# Patient Record
(demographics unavailable — no encounter records)

---

## 2024-12-27 NOTE — HISTORY OF PRESENT ILLNESS
[de-identified] :  HPI: 12/27/2024: NASIR PEREZ is a 43 year y/o F who presents for left buttock pain. Patient has been experiencing these symptoms for two weeks. Patient notes symptoms worsen with prolonged standing, sleeping on the affected side. Patient denies any low back pain. Patient also notes an episode of left lower extremity swelling but notes that this swelling does improve throughout the day.  for the [employer]. Affects her when she steps up with the left lower extremity to go up a ladder and affecting her ability to do her job. Patient also notes numbness in the LLE into her left knee. Patient has attempted conservative measures including ibuprofen with only temporary relief. Patient tolerates well with no GI distress. Patient denies any kidney issues or gastric ulcers.    Patient has had a previous examination with a chiropractor for her hips 20+ years ago.   Patient is not on a blood thinner.     Patient presents today, 43 year F, for evaluation of their LT HIP Date of Injury/Onset: 2 weeks, worse since 12/23/2024. Patient is doing work around the house on weekends and feels that may be aggravating. Mechanism of injury: NKI This is not a Work-Related Injury being treated under Worker's Compensation. This is not an athletic injury occurring associated with an interscholastic or organized sports team.   Pain: At Rest: 4-5 /10 With Activity: 6-7 /10 Quality of symptoms: intermittent burning pain on side and back of hip. numbness and tingling radiating down to calf. Patient reports swelling in hip down to knee since 12/23/2024. Patient denies lower back pain.    Affecting Sleep: Yes Stiffness upon waking, lasting greater than 30mins: Yes Difficulty with stairs: Yes Difficulty getting in and out of car: Yes Difficulty applying shoe: No Sit to stand stiffness: Yes Affects walking short/long distances? Yes Home/Work/Recreation affected? Yes   Improves with:   Worse with:  sitting, standing, walking, sleeping Have you been treated for this in the past? no but says she saw a chiropractor for her hips 20+ years ago Have you had surgery for this in the past? none   OTC Medicines: ibuprofen RX medicines:  none Heat, Ice, Elevation: elevation helps with swelling   CSI or Gel Injections: None Physical Therapy/ HEP: None. Pt reports doing her own stretches at home.   Previous Treatment/Imaging/Studies Since Last Visit: none

## 2024-12-27 NOTE — IMAGING
[de-identified] : LEFT Lower Extremity examined.   Inspection no gross deformity, skin intact, no erythema Palpation; patient with - tenderness to palpation in the groin, + tenderness over the greater troches ++ TTP piriformis/sciatic region ROM: 120 flexion, 30 IR, 60 ER, 50 Abduction, 20 Adduction No groin pain with ROM - [place] - FADIR, - JORDIN - SLR 5/5 motor to lower extremity Sensation intact to light touch throughout all lower extremity dermatomes No numbness in lateral femoral cutaneous nerve 2+ distal pulses No Leg length discrepancy assessed at the medial mal   AP Pelvis, AP Hip and Frog lateral of LEFT hip. Joint spaces well maintained. Mild dysplastic changes noted. No fractures noted.  AP/Lateral of lumbar spine. No fractures noted. No listhesis noted. Disc spaces appear to be well maintained.

## 2024-12-27 NOTE — ASSESSMENT
[FreeTextEntry1] : NASIR PEREZ is a 43-year y/o F with history and physical exam consistent with left hip abductor tendonitis. No instability or weakness that would warrant an MRI today.   - Recommend physical therapy to regain range of motion, strengthening and symptomatic improvement. Prescription given in office today. Patient can continue with her exercises at home independently.  -  After discussion of options, Patient was provided with a prescription for Meloxicam 15mg. Time was taken to discuss the importance of proper prescription drug management. They were instructed to take this daily with food for two weeks and then intermittently as needed. The patient was also warned of potential risks/side effects of the medication. These potential risks include bruising, gastrointestinal bleed, gastrointestinal burning, allergic reaction and reduced blood clotting. The patient expressed verbal understanding. I recommend that the patient follow-up with their medical physician to discuss any significant specific potential issues with long term use such as interactions with current medications or with exacerbation of underlying medical morbidities.  - Recommend rest, ice, compression, elevation - Recommend activity modification, avoid strenuous or high impact activities   Patient was educated on their diagnosis today. Emphasized the importance of gentle stretching and strengthening. Patient expressed understanding and all questions were answered today.   Follow up in 6 weeks with physical therapy and meloxicam. At that point, we can consider an MRI of the left hip to evaluate for hip abductor tear.

## 2025-02-12 NOTE — HISTORY OF PRESENT ILLNESS
[de-identified] : 2/12/2025: NASIR PEREZ is a 43 year y/o F who presents for a f/u evaluation of left buttock pain. Patient was prescribed PT and Meloxicam at her last visit. Patient has had 99% improvement with four weeks twice weekly PT and Meloxicam. Currently not taking Meloxicam and reports being almost pain free.   HPI: 12/27/2024: NASIR PEREZ is a 43 year y/o F who presents for left buttock pain. Patient has been experiencing these symptoms for two weeks. Patient notes symptoms worsen with prolonged standing, sleeping on the affected side. Patient denies any low back pain. Patient also notes an episode of left lower extremity swelling but notes that this swelling does improve throughout the day.  for the [employer] Affects her when she steps up with the left lower extremity to go up a ladder and affecting her ability to do her job. Patient also notes numbness in the LLE into her left knee. Patient has attempted conservative measures including ibuprofen with only temporary relief. Patient tolerates well with no GI distress. Patient denies any kidney issues or gastric ulcers.    Patient has had a previous examination with a chiropractor for her hips 20+ years ago.   Patient is not on a blood thinner.     Patient presents today, 43 year F, for evaluation of their LT HIP Date of Injury/Onset: 2 weeks, worse since 12/23/2024. Patient is doing work around the house on weekends and feels that may be aggravating. Mechanism of injury: NKI This is not a Work-Related Injury being treated under Worker's Compensation. This is not an athletic injury occurring associated with an interscholastic or organized sports team.   Pain: At Rest: 4-5 /10 With Activity: 6-7 /10 Quality of symptoms: intermittent burning pain on side and back of hip. numbness and tingling radiating down to calf. Patient reports swelling in hip down to knee since 12/23/2024. Patient denies lower back pain.    Affecting Sleep: Yes Stiffness upon waking, lasting greater than 30mins: Yes Difficulty with stairs: Yes Difficulty getting in and out of car: Yes Difficulty applying shoe: No Sit to stand stiffness: Yes Affects walking short/long distances? Yes Home/Work/Recreation affected? Yes   Improves with:   Worse with:  sitting, standing, walking, sleeping Have you been treated for this in the past? no but says she saw a chiropractor for her hips 20+ years ago Have you had surgery for this in the past? none   OTC Medicines: ibuprofen RX medicines:  none Heat, Ice, Elevation: elevation helps with swelling   CSI or Gel Injections: None Physical Therapy/ HEP: None. Pt reports doing her own stretches at home.   Previous Treatment/Imaging/Studies Since Last Visit: none

## 2025-02-12 NOTE — IMAGING
[de-identified] : LEFT Lower Extremity examined.   Inspection no gross deformity, skin intact, no erythema Palpation; patient with - tenderness to palpation in the groin, No tenderness over the greater troches - resolved  No TTP piriformis/sciatic region - resolved  ROM: 120 flexion, 30 IR, 60 ER, 50 Abduction, 20 Adduction No groin pain with ROM - [place] - FADIR, - JORDIN - SLR 5/5 motor to lower extremity Sensation intact to light touch throughout all lower extremity dermatomes No numbness in lateral femoral cutaneous nerve 2+ distal pulses No Leg length discrepancy assessed at the medial mal   12/27: AP Pelvis, AP Hip and Frog lateral of LEFT hip. Joint spaces well maintained. Mild dysplastic changes noted. No fractures noted.  AP/Lateral of lumbar spine. No fractures noted. No listhesis noted. Disc spaces appear to be well maintained.

## 2025-02-12 NOTE — ASSESSMENT
[FreeTextEntry1] : NASIR PEREZ is a 43-year y/o F with history and physical exam consistent with left hip abductor tendonitis, which has resolved with PT and meloxicam. Patient notes her symptoms have dramatically improved and satisfied with her outcome at this point.    - Recommend continued physical therapy exercises at home independently.  - Recommend OTC NSAIDs or Tylenol as needed for pain.   - Recommend ice if needed - Recommend activity as tolerated.    Patient was educated on their diagnosis today. Emphasized the importance of gentle stretching and strengthening. Patient expressed understanding and all questions were answered today.   Follow up as needed. Patient can call if [id number] worsen or change and we can make appt for re-evaluation.